# Patient Record
Sex: FEMALE | URBAN - METROPOLITAN AREA
[De-identification: names, ages, dates, MRNs, and addresses within clinical notes are randomized per-mention and may not be internally consistent; named-entity substitution may affect disease eponyms.]

---

## 2017-11-08 ENCOUNTER — THERAPY VISIT (OUTPATIENT)
Dept: PHYSICAL THERAPY | Facility: CLINIC | Age: 67
End: 2017-11-08
Payer: COMMERCIAL

## 2017-11-08 DIAGNOSIS — G89.29 CHRONIC BILATERAL LOW BACK PAIN WITHOUT SCIATICA: Primary | ICD-10-CM

## 2017-11-08 DIAGNOSIS — M54.50 CHRONIC BILATERAL LOW BACK PAIN WITHOUT SCIATICA: Primary | ICD-10-CM

## 2017-11-08 PROCEDURE — 97112 NEUROMUSCULAR REEDUCATION: CPT | Mod: GP | Performed by: PHYSICAL THERAPIST

## 2017-11-08 PROCEDURE — 97161 PT EVAL LOW COMPLEX 20 MIN: CPT | Mod: GP | Performed by: PHYSICAL THERAPIST

## 2017-11-08 PROCEDURE — 97110 THERAPEUTIC EXERCISES: CPT | Mod: GP | Performed by: PHYSICAL THERAPIST

## 2017-11-08 NOTE — MR AVS SNAPSHOT
After Visit Summary   11/8/2017    Bharath Winchester    MRN: [id number]           Patient Information     Date Of Birth          1950        Visit Information        Provider Department      11/8/2017 3:30 PM Keira Jett, PT [facility] Athletic [facility] Physical [facility]        Today's Diagnoses     Chronic bilateral low back pain without sciatica    -  1       Follow-ups after your visit        Your next 10 appointments already scheduled     Nov 16, 2017  4:10 PM CST   FLORESITA Spine with Keira Jett PT   [facility] Athletic [facility] Physical Therapy ([facility])    [address] [place] [address] [address]  [facility] [address]   [phone]            Nov 24, 2017  7:00 AM CST   FLORESITA Spine with Keira Jett PT   [facility] Physical Therapy ([facility])    [address] [place] [address] [address]  [facility] [address]   [phone]            Dec 01, 2017  7:00 AM CST   FLORESITA Spine with Keira Jett PT   [facility] Physical Therapy ([facility])    [address] [place] [address] [address]  [facility] [address]   [phone]              Who to contact     If you have questions or need follow up information about today's clinic visit or your schedule please contact [facility] ATHLETIC [facility] PHYSICAL [facility] directly at [phone].  Normal or non-critical lab and imaging results will be communicated to you by MyChart, letter or phone within 4 business days after the clinic has received the results. If you do not hear from us within 7 days, please contact the clinic through MyChart or phone. If you have a critical or abnormal lab result, we will notify you by phone as soon as possible.  Submit refill requests through [employer] or call your pharmacy and they will forward the refill request to us. Please allow 3 business days for  "your refill to be completed.          Additional Information About Your Visit        CompareAwayhart Information     [employer] lets you send messages to your doctor, view your test results, renew your prescriptions, schedule appointments and more. To sign up, go to [email or web address] at the [email or web address] . Click on \"Log in\" on the left side of the screen, which will take you to the Welcome page. Then click on \"Sign up Now\" on the right side of the page.     You will be asked to enter the access code listed below, as well as some personal information. Please follow the directions to create your username and password.     Your access code is: [email or web address]  Expires: 2018  5:23 PM     Your access code will  in 90 days. If you need help or a new code, please call your [place] clinic or [phone].        Care EveryWhere ID     This is your Care EveryWhere ID. This could be used by other organizations to access your [place] medical records  [id number]         Blood Pressure from Last 3 Encounters:   No data found for BP    Weight from Last 3 Encounters:   No data found for Wt              We Performed the Following     FLORESITA Inital Eval Report     Neuromuscular Re-Education     PT Eval, Low Complexity (68415)     Therapeutic Exercises        Primary Care Provider Office Phone # Fax #    Edna Bautista [phone]       [place] [facility] [address]  [facility] [address]        Equal Access to Services     MAMTA SIN : Hadii aad ku hadasho Soomaali, waaxda luqadaha, qaybta kaalmada adeegyada, cj oliver hayfadi potter . So [facility] [phone].    ATENCIÓN: Si habla español, tiene a bone disposición servicios gratuitos de asistencia lingüística. Llame al [phone].    We comply with applicable federal civil rights laws and [place] laws. We do not discriminate on the basis of race, color, national origin, age, disability, sex, sexual orientation, or gender identity.            Thank you!     Thank " you for choosing [facility] PHYSICAL THERAPY  for your care. Our goal is always to provide you with excellent care. Hearing back from our patients is one way we can continue to improve our services. Please take a few minutes to complete the written survey that you may receive in the mail after your visit with us. Thank you!             Your Updated Medication List - Protect others around you: Learn how to safely use, store and throw away your medicines at www.disposemymeds.org.      Notice  As of 11/8/2017  5:23 PM    You have not been prescribed any medications.

## 2017-11-08 NOTE — PROGRESS NOTES
[facility] Initial Evaluation    Subjective:    Patient is a 67 year old female presenting with rehab back hpi.   Bharath Winchester is a 67 year old female with a lumbar condition.  Condition occurred with:  Degenerative joint disease.  Condition occurred: during recreation/sport.  This is a chronic condition  I had low back pain and had fusion 7/2015 on L5-S1.  I had less pain after surgery, but still have pain.  This year over the summer, I was unable to keep the pain uncontrol.  I had increase in pain over the last few years.  If I over do I have increase in back pain.  I also have some stomach and some right shoulder pain.  I am right handed.    Patient reports pain:  Lumbar spine right and lumbar spine left (right side greater than left.).  Radiates to:  No radiation.  Pain is described as shooting and stabbing and is constant and reported as 2/10 and 7/10 (range).  Associated with: left sided weakness. Pain is worse in the P.M..  Symptoms are exacerbated by sitting, bending and twisting Relieved by: TENS unit, stretching.  Since onset symptoms are gradually worsening (more pain).  Special testing: blood work for insistene.  Previous treatment includes physical therapy (post surgery).    General health as reported by patient is good.  Pertinent medical history includes:  High blood pressure and migraines (changes in bowel and bladder).  Medical allergies: yes (codene).  Other surgeries include:  Orthopedic surgery (low back L5-S1 fusion).    Current occupation is psychologist.  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  Primary job tasks include:  Prolonged sitting.    Barriers: multilevel, gardening, [place]    Red flags:  None as reported by the patient.                        Objective:    Standing Alignment:    Cervical/Thoracic:  Convex thoracic scoliosis R and forward head  Shoulder/UE:  Rounded shoulders, protracted scapula L, protracted scapula  R, scapular winging L, scapular winging R, scapular abduction L and scapular abduction R  Lumbar:  Lordosis decr and sway back  Pelvic:  Iliac crest high R  Hip:  Greater trochanter high R        Gait:      Deviations:  Hip:  Decr dynamic control L and Trendelenberg LGeneral Deviations:  Stance time decr    Flexibility/Screens:   Positive screens:  LumbarNegative screens: Hip (weakness)     Lower Extremity:  Decreased left lower extremity flexibility:Hamstrings and Gastroc    Decreased right lower extremity flexibility:  Hamstrings and Gastroc               Lumbar/SI Evaluation  ROM:    AROM Lumbar:   Flexion:        Moderate movement  Ext:                    WFL   Side Bend:        Left:     Right:   Rotation:           Left:     Right:   Side Glide:        Left:     Right:         Strength: left hip abduction 3+/5, right 4+/5, TA 1+/5  Lumbar Myotomes:    T12-L3 (Hip Flex):  Left: 5    Right: 5  L2-4 (Quads):  Left:  5    Right:  5  L4 (Ankle DF):  Left:  5      L5 (Great Toe Ext): Left: 4    Right: 4           Neural Tension/Mobility:      Left side:SLR or Slump (tightness)  negative.     Right side:   Slump (tightness) or SLR  negative.   Lumbar Palpation:    Tenderness present at Left:    Gluteus Medius    Functional Tests:  Core strength and proprioception lumbar: balance left leg 0, right 20 sec         Lumbar Provocation:    Left positive with:  Mobility  Right positive with: Mobility  Spinal Segmental Conclusions: NA                                                       General     ROS    Assessment/Plan:      Patient is a 67 year old female with lumbar complaints.    Patient has the following significant findings with corresponding treatment plan.                Diagnosis 1:  Low back pain, s/p back fusion L5-S1 and scoliosis  Pain -  manual therapy, self management, education and home program  Decreased ROM/flexibility - manual therapy, therapeutic exercise, therapeutic activity and home  program  Decreased strength - therapeutic exercise, therapeutic activities and home program  Impaired balance - neuro re-education, gait training, therapeutic activities and home program  Impaired gait - gait training and home program  Impaired muscle performance - neuro re-education and home program  Decreased function - therapeutic activities and home program  Impaired posture - neuro re-education, therapeutic activities and home program    Therapy Evaluation Codes:   1) History comprised of:   Personal factors that impact the plan of care:      Past/current experiences and Time since onset of symptoms.    Comorbidity factors that impact the plan of care are:      High blood pressure, Migraines/headaches and back surgery and changes in bowel and bladder.     Medications impacting care: High blood pressure and Sleep.  2) Examination of Body Systems comprised of:   Body structures and functions that impact the plan of care:      Hip, Lumbar spine, Pelvis and Thoracic Spine.   Activity limitations that impact the plan of care are:      Bathing, Bending, Driving, Dressing, Lifting, Sitting, Stairs, Walking, Working and Laying down.  3) Clinical presentation characteristics are:   Stable/Uncomplicated.  4) Decision-Making    Moderate complexity using standardized patient assessment instrument and/or measureable assessment of functional outcome.  Cumulative Therapy Evaluation is: Low complexity.    Previous and current functional limitations:  (See Goal Flow Sheet for this information)    Short term and Long term goals: (See Goal Flow Sheet for this information)     Communication ability:  Patient appears to be able to clearly communicate and understand verbal and written communication and follow directions correctly.  Treatment Explanation - The following has been discussed with the patient:   RX ordered/plan of care  Possible risks and side effects  This patient would benefit from PT intervention to resume normal  activities.   Rehab potential is good.    Frequency:  1 X week, once daily  Duration:  for 8 weeks  Discharge Plan:  Achieve all LTG.  Independent in home treatment program.    Please refer to the daily flowsheet for treatment today, total treatment time and time spent performing 1:1 timed codes.

## 2017-11-08 NOTE — LETTER
[facility] ATHLETIC [facility] PHYSICAL THERAPY  [address]  [facility] [address]  [phone]    2017    Re: Bharath Winchester   :   1950  MRN:  [id number]   REFERRING PHYSICIAN:   Edna Bautista    [facility]    Date of Initial Evaluation: 2017  Visits:  Rxs Used: 1  Reason for Referral:  Chronic bilateral low back pain without sciatica    EVALUATION SUMMARY    Subjective:  Patient is a 67 year old female presenting with rehab back hpi.   Bharath Winchester is a 67 year old female with a lumbar condition.  Condition occurred with: Degenerative joint disease.  Condition occurred: during recreation/sport.  This is a chronic condition  I had low back pain and had fusion 2015 on L5-S1.  I had less pain after surgery, but still have pain.  This year over the summer, I was unable to keep the pain uncontrol.  I had increase in pain over the last few years.  If I over do I have increase in back pain.  I also have some stomach and some right shoulder pain.  I am right handed.    Patient reports pain:  Lumbar spine right and lumbar spine left (right side greater than left.).  Radiates to:  No radiation.  Pain is described as shooting and stabbing and is constant and reported as 2/10 and 7/10 (range).  Associated with: left sided weakness. Pain is worse in the P.M..  Symptoms are exacerbated by sitting, bending and twisting Relieved by: TENS unit, stretching.  Since onset symptoms are gradually worsening (more pain).  Special testing: blood work for insistene.  Previous treatment includes physical therapy (post surgery).    General health as reported by patient is good.  Pertinent medical history includes:  High blood pressure and migraines (changes in bowel and bladder).  Medical allergies: yes (codene).  Other surgeries include:  Orthopedic surgery (low back L5-S1 fusion).    Current occupation is  psychologist.  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  Primary job tasks include:  Prolonged sitting.  Barriers: multilevel, gardening, [place]  Red flags:  None as reported by the patient.                  Objective:  Standing Alignment:    Cervical/Thoracic:  Convex thoracic scoliosis R and forward head  Shoulder/UE:  Rounded shoulders, protracted scapula L, protracted scapula R, scapular winging L, scapular winging R, scapular abduction L and scapular abduction R  Lumbar:  Lordosis decr and sway back  Pelvic:  Iliac crest high R  Hip:  Greater trochanter high R    Re: Bharath Winchester   :   1950    Gait:   Deviations:  Hip:  Decr dynamic control L and Trendelenberg LGeneral Deviations:  Stance time decr  Flexibility/Screens:   Positive screens:  LumbarNegative screens: Hip (weakness)   Lower Extremity:  Decreased left lower extremity flexibility:Hamstrings and Gastroc  Decreased right lower extremity flexibility:  Hamstrings and Gastroc       Lumbar/SI Evaluation  ROM:    AROM Lumbar:   Flexion:        Moderate movement  Ext:                    WFL   Side Bend:        Left:     Right:   Rotation:           Left:     Right:   Side Glide:        Left:     Right:       Strength: left hip abduction 3+/5, right 4+/5, TA 1+/5  Lumbar Myotomes:    T12-L3 (Hip Flex):  Left: 5    Right: 5  L2-4 (Quads):  Left:  5    Right:  5  L4 (Ankle DF):  Left:  5      L5 (Great Toe Ext): Left: 4    Right: 4   Neural Tension/Mobility:    Left side:SLR or Slump (tightness)  negative.   Right side:   Slump (tightness) or SLR  negative.   Lumbar Palpation:    Tenderness present at Left:    Gluteus Medius  Functional Tests:  Core strength and proprioception lumbar: balance left leg 0, right 20 sec   Lumbar Provocation:    Left positive with:  Mobility  Right positive with: Mobility  Spinal Segmental Conclusions: NA    Assessment/Plan:    Patient is a 67 year old female with lumbar  complaints.    Patient has the following significant findings with corresponding treatment plan.                Diagnosis 1:  Low back pain, s/p back fusion L5-S1 and scoliosis  Pain -  manual therapy, self management, education and home program  Decreased ROM/flexibility - manual therapy, therapeutic exercise, therapeutic activity and home program  Decreased strength - therapeutic exercise, therapeutic activities and home program  Impaired balance - neuro re-education, gait training, therapeutic activities and home program  Impaired gait - gait training and home program  Impaired muscle performance - neuro re-education and home program  Decreased function - therapeutic activities and home program  Impaired posture - neuro re-education, therapeutic activities and home program      Re: Bharath Winchester   :   1950    Therapy Evaluation Codes:   1) History comprised of:   Personal factors that impact the plan of care:      Past/current experiences and Time since onset of symptoms.    Comorbidity factors that impact the plan of care are:      High blood pressure, Migraines/headaches and back surgery and changes in bowel and bladder.     Medications impacting care: High blood pressure and Sleep.  2) Examination of Body Systems comprised of:   Body structures and functions that impact the plan of care:      Hip, Lumbar spine, Pelvis and Thoracic Spine.   Activity limitations that impact the plan of care are:      Bathing, Bending, Driving, Dressing, Lifting, Sitting, Stairs, Walking, Working and Laying down.  3) Clinical presentation characteristics are:   Stable/Uncomplicated.  4) Decision-Making    Moderate complexity using standardized patient assessment instrument and/or measureable assessment of functional outcome.  Cumulative Therapy Evaluation is: Low complexity.    Previous and current functional limitations:  (See Goal Flow Sheet for this information)    Short term and Long term goals: (See Goal Flow Sheet  for this information)     Communication ability:  Patient appears to be able to clearly communicate and understand verbal and written communication and follow directions correctly.  Treatment Explanation - The following has been discussed with the patient:   RX ordered/plan of care  Possible risks and side effects  This patient would benefit from PT intervention to resume normal activities.   Rehab potential is good.    Frequency:  1 X week, once daily  Duration:  for 8 weeks  Discharge Plan:  Achieve all LTG.  Independent in home treatment program.    Thank you for your referral.    INQUIRIES  Therapist: Keira Jett, PT   [facility]ICAL [facility]  [address] [address]  Phone: [phone]  Fax: [phone]

## 2017-11-16 ENCOUNTER — THERAPY VISIT (OUTPATIENT)
Dept: PHYSICAL THERAPY | Facility: CLINIC | Age: 67
End: 2017-11-16
Payer: COMMERCIAL

## 2017-11-16 DIAGNOSIS — M54.50 CHRONIC BILATERAL LOW BACK PAIN WITHOUT SCIATICA: ICD-10-CM

## 2017-11-16 DIAGNOSIS — G89.29 CHRONIC BILATERAL LOW BACK PAIN WITHOUT SCIATICA: ICD-10-CM

## 2017-11-16 PROCEDURE — 97112 NEUROMUSCULAR REEDUCATION: CPT | Mod: GP | Performed by: PHYSICAL THERAPIST

## 2017-11-16 PROCEDURE — 97110 THERAPEUTIC EXERCISES: CPT | Mod: GP | Performed by: PHYSICAL THERAPIST

## 2017-11-16 PROCEDURE — 97530 THERAPEUTIC ACTIVITIES: CPT | Mod: GP | Performed by: PHYSICAL THERAPIST

## 2017-11-24 ENCOUNTER — THERAPY VISIT (OUTPATIENT)
Dept: PHYSICAL THERAPY | Facility: CLINIC | Age: 67
End: 2017-11-24
Payer: COMMERCIAL

## 2017-11-24 DIAGNOSIS — M54.50 CHRONIC BILATERAL LOW BACK PAIN WITHOUT SCIATICA: ICD-10-CM

## 2017-11-24 DIAGNOSIS — G89.29 CHRONIC BILATERAL LOW BACK PAIN WITHOUT SCIATICA: ICD-10-CM

## 2017-11-24 PROCEDURE — 97530 THERAPEUTIC ACTIVITIES: CPT | Mod: GP | Performed by: PHYSICAL THERAPIST

## 2017-11-24 PROCEDURE — 97112 NEUROMUSCULAR REEDUCATION: CPT | Mod: GP | Performed by: PHYSICAL THERAPIST

## 2017-11-24 PROCEDURE — 97110 THERAPEUTIC EXERCISES: CPT | Mod: GP | Performed by: PHYSICAL THERAPIST

## 2017-12-01 ENCOUNTER — THERAPY VISIT (OUTPATIENT)
Dept: PHYSICAL THERAPY | Facility: CLINIC | Age: 67
End: 2017-12-01
Payer: COMMERCIAL

## 2017-12-01 DIAGNOSIS — G89.29 CHRONIC BILATERAL LOW BACK PAIN WITHOUT SCIATICA: ICD-10-CM

## 2017-12-01 DIAGNOSIS — M54.50 CHRONIC BILATERAL LOW BACK PAIN WITHOUT SCIATICA: ICD-10-CM

## 2017-12-01 PROCEDURE — 97110 THERAPEUTIC EXERCISES: CPT | Mod: GP | Performed by: PHYSICAL THERAPIST

## 2017-12-01 PROCEDURE — 97112 NEUROMUSCULAR REEDUCATION: CPT | Mod: GP | Performed by: PHYSICAL THERAPIST

## 2018-01-08 ENCOUNTER — THERAPY VISIT (OUTPATIENT)
Dept: PHYSICAL THERAPY | Facility: CLINIC | Age: 68
End: 2018-01-08
Payer: COMMERCIAL

## 2018-01-08 DIAGNOSIS — M54.50 CHRONIC BILATERAL LOW BACK PAIN WITHOUT SCIATICA: ICD-10-CM

## 2018-01-08 DIAGNOSIS — G89.29 CHRONIC BILATERAL LOW BACK PAIN WITHOUT SCIATICA: ICD-10-CM

## 2018-01-08 PROCEDURE — 97110 THERAPEUTIC EXERCISES: CPT | Mod: GP | Performed by: PHYSICAL THERAPIST

## 2018-01-25 ENCOUNTER — THERAPY VISIT (OUTPATIENT)
Dept: PHYSICAL THERAPY | Facility: CLINIC | Age: 68
End: 2018-01-25
Payer: COMMERCIAL

## 2018-01-25 DIAGNOSIS — G89.29 CHRONIC BILATERAL LOW BACK PAIN WITHOUT SCIATICA: ICD-10-CM

## 2018-01-25 DIAGNOSIS — M54.50 CHRONIC BILATERAL LOW BACK PAIN WITHOUT SCIATICA: ICD-10-CM

## 2018-01-25 PROCEDURE — 97110 THERAPEUTIC EXERCISES: CPT | Mod: GP | Performed by: PHYSICAL THERAPIST

## 2018-01-25 PROCEDURE — 97112 NEUROMUSCULAR REEDUCATION: CPT | Mod: GP | Performed by: PHYSICAL THERAPIST

## 2018-01-25 NOTE — MR AVS SNAPSHOT
"              After Visit Summary   2018    Bharath Winchester    MRN: [id number]           Patient Information     Date Of Birth          1950        Visit Information        Provider Department      2018 11:40 AM Keira Jett PT [facility] Athletic [facility] Physical [facility]        Today's Diagnoses     Chronic bilateral low back pain without sciatica           Follow-ups after your visit        Who to contact     If you have questions or need follow up information about today's clinic visit or your schedule please contact [facility] ATHLETIC [facility] PHYSICAL [facility] directly at [phone].  Normal or non-critical lab and imaging results will be communicated to you by [employer], letter or phone within 4 business days after the clinic has received the results. If you do not hear from us within 7 days, please contact the clinic through [employer] or phone. If you have a critical or abnormal lab result, we will notify you by phone as soon as possible.  Submit refill requests through [employer] or call your pharmacy and they will forward the refill request to us. Please allow 3 business days for your refill to be completed.          Additional Information About Your Visit        MyChart Information     [employer] lets you send messages to your doctor, view your test results, renew your prescriptions, schedule appointments and more. To sign up, go to [email or web address] . Click on \"Log in\" on the left side of the screen, which will take you to the Welcome page. Then click on \"Sign up Now\" on the right side of the page.     You will be asked to enter the access code listed below, as well as some personal information. Please follow the directions to create your username and password.     Your access code is: [email or web address]  Expires: 2018  5:23 PM     Your access code will  in 90 days. If you need help or a new code, please call your [place] clinic or " [phone].        Care EveryWhere ID     This is your Care EveryWhere ID. This could be used by other organizations to access your [place] medical records  [id number]         Blood Pressure from Last 3 Encounters:   No data found for BP    Weight from Last 3 Encounters:   No data found for Wt              We Performed the Following     FLORESITA Progress Notes Report     Neuromuscular Re-Education     Therapeutic Exercises        Primary Care Provider Office Phone # Fax #    Edna Bautista [phone]       [place] [facility] [address] [facility] [address] [address]        Equal Access to Services     [facility]: Hadii aad ku hadasho Soomaali, waaxda luqadaha, qaybta kaalmada adeegyada, waxay idiin hayaan adeeg martin potter . So [facility] [phone].    ATENCIÓN: Si habla español, tiene a bone disposición servicios gratuitos de asistencia lingüística. [facility] [phone].    We comply with applicable federal civil rights laws and [place] laws. We do not discriminate on the basis of race, color, national origin, age, disability, sex, sexual orientation, or gender identity.            Thank you!     Thank you for choosing [facility] PHYSICAL THERAPY  for your care. Our goal is always to provide you with excellent care. Hearing back from our patients is one way we can continue to improve our services. Please take a few minutes to complete the written survey that you may receive in the mail after your visit with us. Thank you!             Your Updated Medication List - Protect others around you: Learn how to safely use, store and throw away your medicines at www.disposemymeds.org.      Notice  As of 1/25/2018  8:06 PM    You have not been prescribed any medications.

## 2018-01-25 NOTE — LETTER
[facility] ATHLETIC [facility] PHYSICAL THERAPY  [address]  [place] [address]  [phone]    2018    Re: Bharath Winchester   :   1950  MRN:  [id number]   REFERRING PHYSICIAN:   Edna Bautista    [facility] ATHLETIC [facility] PHYSICAL [facility]    Date of Initial Evaluation: 2017  Visits:  Rxs Used: 6  Reason for Referral:  Chronic bilateral low back pain without sciatica    PROGRESS  REPORT  Progress reporting period is from 2017 to 2018.       SUBJECTIVE  HPI  Oswestry Score: 30 %  Subjective changes noted by patient:  I have gotten some medication which has helped me sleep.  I have been trying to volunteer.  I had to shovel and watch how I do things.  I have integrated good habit.    Current Pain level: 3/10 (in the morning, the afternoon 6-7/10).     Initial Pain level: 7/10.   Changes in function:  Yes (See Goal flowsheet attached for changes in current functional level)  Adverse reaction to treatment or activity: activity - lifting, medical testing all increase in back soreness.    OBJECTIVE  Changes noted in objective findings:  Yes,  TROM flexion mid shin SB left 75% with pulling, right WFL, extension moderate movement.  Strength: hip abduction left 4+/5, right 4/5  TA 2-/5.  Increase awareness of posture and back position.  Instruction in basic body mechanics.  Discussed with patient progression of exercises and home exercise program.     ASSESSMENT/PLAN  Updated problem list and treatment plan: Diagnosis 1:  Low back pain (s/p lumbar fusion)  Pain -  self management, education, directional preference exercise and home program  Decreased ROM/flexibility - manual therapy, therapeutic exercise, therapeutic activity and home program  Decreased strength - therapeutic exercise, therapeutic activities and home program  Impaired muscle performance - neuro re-education and home program  STG/LTGs have been met or  progress has been made towards goals:  Yes (See Goal flow sheet completed today.)  Assessment of Progress: The patient's condition is improving.  The patient's condition has potential to improve.  Re: Bharath Winchester   :   1950    Self Management Plans:  Patient has been instructed in a home treatment program.  Patient  has been instructed in self management of symptoms.    Recommendations:  Patient would like to try exercises on her own.  Patient to call if any questions.  Patient does have a few appointments left on original order and may return if unable to progress with exercises or function.  Patient also having other medical testing.  Thank you for the opportunity of living with this motivated individual.    Thank you for your referral.    INQUIRIES  Therapist: Keira Jett PT  [facility]  [address] [address]  Phone: [phone]  Fax: [phone]

## 2018-01-26 NOTE — PROGRESS NOTES
Subjective:  HPI  Oswestry Score: 30 %                 Objective:  System    Physical Exam    General     ROS    Assessment/Plan:    PROGRESS  REPORT    Progress reporting period is from 11/8/2017 to 1/25/2018.       SUBJECTIVE  Subjective changes noted by patient:  I have gotten some medication which has helped me sleep.  I have been trying to volunteer.  I had to shovel and watch how I do things.  I have integrated good habit.     Current Pain level: 3/10 (in the morning, the afternoon 6-7/10).      Initial Pain level: 7/10.   Changes in function:  Yes (See Goal flowsheet attached for changes in current functional level)  Adverse reaction to treatment or activity: activity - lifting, medical testing all increase in back soreness.    OBJECTIVE  Changes noted in objective findings:  Yes,  TROM flexion mid shin SB left 75% with pulling, right WFL, extension moderate movement.  Strength: hip abduction left 4+/5, right 4/5  TA 2-/5.  Increase awareness of posture and back position.  Instruction in basic body mechanics.  Discussed with patient progression of exercises and home exercise program.       ASSESSMENT/PLAN  Updated problem list and treatment plan: Diagnosis 1:  Low back pain (s/p lumbar fusion)  Pain -  self management, education, directional preference exercise and home program  Decreased ROM/flexibility - manual therapy, therapeutic exercise, therapeutic activity and home program  Decreased strength - therapeutic exercise, therapeutic activities and home program  Impaired muscle performance - neuro re-education and home program  STG/LTGs have been met or progress has been made towards goals:  Yes (See Goal flow sheet completed today.)  Assessment of Progress: The patient's condition is improving.  The patient's condition has potential to improve.  Self Management Plans:  Patient has been instructed in a home treatment program.  Patient  has been instructed in self management of  symptoms.      Recommendations:  Patient would like to try exercises on her own.  Patient to call if any questions.  Patient does have a few appointments left on original order and may return if unable to progress with exercises or function.  Patient also having other medical testing.  Thank you for the opportunity of living with this motivated individual.    Please refer to the daily flowsheet for treatment today, total treatment time and time spent performing 1:1 timed codes.

## 2018-02-19 PROBLEM — M54.50 CHRONIC BILATERAL LOW BACK PAIN WITHOUT SCIATICA: Status: RESOLVED | Noted: 2017-11-08 | Resolved: 2018-02-19

## 2018-02-19 PROBLEM — G89.29 CHRONIC BILATERAL LOW BACK PAIN WITHOUT SCIATICA: Status: RESOLVED | Noted: 2017-11-08 | Resolved: 2018-02-19

## 2018-08-30 ENCOUNTER — THERAPY VISIT (OUTPATIENT)
Dept: PHYSICAL THERAPY | Facility: CLINIC | Age: 68
End: 2018-08-30
Payer: COMMERCIAL

## 2018-08-30 DIAGNOSIS — G89.29 CHRONIC BILATERAL LOW BACK PAIN WITHOUT SCIATICA: Primary | ICD-10-CM

## 2018-08-30 DIAGNOSIS — M54.50 CHRONIC BILATERAL LOW BACK PAIN WITHOUT SCIATICA: Primary | ICD-10-CM

## 2018-08-30 PROCEDURE — 97112 NEUROMUSCULAR REEDUCATION: CPT | Mod: GP | Performed by: PHYSICAL THERAPIST

## 2018-08-30 PROCEDURE — 97110 THERAPEUTIC EXERCISES: CPT | Mod: GP | Performed by: PHYSICAL THERAPIST

## 2018-08-30 PROCEDURE — 97161 PT EVAL LOW COMPLEX 20 MIN: CPT | Mod: GP | Performed by: PHYSICAL THERAPIST

## 2018-08-30 NOTE — PROGRESS NOTES
[facility] Initial Evaluation  Subjective:  Patient is a 68 year old female presenting with rehab back hpi. The history is provided by the patient. No  was used.   Bharath Winchester is a 68 year old female with a lumbar condition.  Condition occurred with:  Degenerative joint disease.  Condition occurred: for unknown reasons.  This is a chronic condition  I have had back pain for a long time.  I have had a L5-S1 lumbar fusion.  My  was sick earlier this year and had difficult with his back.  He ended up with back surgery and now he can't lift anything.  So I have been doing a lot more activity, lifting, gardening.  I am still walking and watching what I am doing but feel like I need some help.  I saw the MD on 8/21/2018.    I am watching how I lift..    Patient reports pain:  Lumbar spine left and lumbar spine right.  Radiates to:  No radiation.  Pain is described as aching (dull) and is constant and reported as 2/10 and 5/10 (ranges).  Associated with: right leg has done some buckling. Pain is worse in the P.M..  Symptoms are exacerbated by sitting, bending, carrying and lifting and relieved by heat and activity/movement (TENS pads).  Since onset symptoms are gradually worsening (march).  Special testing: none.  Previous treatment includes physical therapy.    General health as reported by patient is good.  Pertinent medical history includes:  Migraines/headaches (digestive issues).  Medical allergies: no.  Other surgeries include:  Orthopedic surgery (back fusion).  Current medications:  High blood pressure medication and sleep medication.  Current occupation is Psychologist, part time  .  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  Primary job tasks include:  Lifting, driving and prolonged sitting.    Barriers: house in cities/[facility].    Red flags:  None as reported by the patient.                        Objective:  Standing Alignment:   "  Cervical/Thoracic:  Convex thoracic scoliosis R (fair plus sitting posture)  Shoulder/UE:  Scapular upward rotation R  Lumbar:  Lordosis decr  Pelvic:  Iliac crest high R  Hip:  Greater trochanter high R        Gait:      Deviations:  Hip:  Decr dynamic control LAnkle:  Heel strike decr L, heel strike decr R, push off decr L and push off decr R  Non-Weight Bearing:    Pelvis:  ASIS high L        Flexibility/Screens:   Positive screens:  Lumbar    Lower Extremity:  Decreased left lower extremity flexibility:Hamstrings and Gastroc    Decreased right lower extremity flexibility:  Hamstrings and Gastroc               Lumbar/SI Evaluation  ROM:    AROM Lumbar:   Flexion:        Lower calf  Ext:                    WFL   Side Bend:        Left:     Right:   Rotation:           Left:     Right:   Side Glide:        Left:     Right:         Strength: TA 1/5, hip abduction right 4/5, left 4/5, able to single leg partial squat x 5 each leg with fair control.  Lumbar Myotomes:    T12-L3 (Hip Flex):  Left: 5    Right: 5  L2-4 (Quads):  Left:  5    Right:  5  L4 (Ankle DF):  Left:  5    Right:  5  L5 (Great Toe Ext): Left: 4+              Neural Tension/Mobility:      Left side:Slump  negative.     Right side:   Slump (tightness)  negative.   Lumbar Palpation:    Tenderness present at Left:    Quadratus Lumborum and Erector Spinae  Tenderness present at Right: Quadratus Lumborum and Erector Spinae  Functional Tests:  Core strength and proprioception lumbar: balance poor bilateral decrease in hip control , bilateral squat moderate movement,             SI joint/Sacrum:        Forward bend seated left: SI compression/distraction \"feels good no pain\"                                                   General     ROS    Assessment/Plan:    Patient is a 68 year old female with lumbar complaints.    Patient has the following significant findings with corresponding treatment plan.                Diagnosis 1:  Low back pain   Pain -  " self management, education, directional preference exercise and home program  Decreased ROM/flexibility - manual therapy, therapeutic exercise, therapeutic activity and home program  Decreased strength - therapeutic exercise, therapeutic activities and home program  Impaired balance - neuro re-education, therapeutic activities and home program  Impaired gait - gait training and home program  Impaired muscle performance - neuro re-education and home program  Decreased function - therapeutic activities and home program  Evaluation ongoing.    Therapy Evaluation Codes:   1) History comprised of:   Personal factors that impact the plan of care:      Past/current experiences, Time since onset of symptoms and amount of driving to and from houses.    Comorbidity factors that impact the plan of care are:      past back fusion.     Medications impacting care: High blood pressure and Sleep.  2) Examination of Body Systems comprised of:   Body structures and functions that impact the plan of care:      Lumbar spine and Pelvis.   Activity limitations that impact the plan of care are:      Bathing, Bending, Cooking, Driving, Lifting, Sitting and Sleeping.  3) Clinical presentation characteristics are:   Stable/Uncomplicated.  4) Decision-Making    Low complexity using standardized patient assessment instrument and/or measureable assessment of functional outcome.  Cumulative Therapy Evaluation is: Low complexity.    Previous and current functional limitations:  (See Goal Flow Sheet for this information)    Short term and Long term goals: (See Goal Flow Sheet for this information)     Communication ability:  Patient appears to be able to clearly communicate and understand verbal and written communication and follow directions correctly.  Treatment Explanation - The following has been discussed with the patient:   RX ordered/plan of care  Possible risks and side effects  This patient would benefit from PT intervention to resume  normal activities.   Rehab potential is good.    Frequency:  1 X week, once daily  Duration:  for 6 weeks  Discharge Plan:  Achieve all LTG.  Independent in home treatment program.    Please refer to the daily flowsheet for treatment today, total treatment time and time spent performing 1:1 timed codes.

## 2018-08-30 NOTE — MR AVS SNAPSHOT
After Visit Summary   8/30/2018    Bharath Winchester    MRN: [id number]           Patient Information     Date Of Birth          1950        Visit Information        Provider Department      8/30/2018 2:50 PM Kiera Jett, PT [facility] Athletic [facility] Physical [facility]        Today's Diagnoses     Chronic bilateral low back pain without sciatica    -  1       Follow-ups after your visit        Who to contact     If you have questions or need follow up information about today's clinic visit or your schedule please contact [facility], located within [facility] PHYSICAL [facility] directly at [phone].  Normal or non-critical lab and imaging results will be communicated to you by MyChart, letter or phone within 4 business days after the clinic has received the results. If you do not hear from us within 7 days, please contact the clinic through MyChart or phone. If you have a critical or abnormal lab result, we will notify you by phone as soon as possible.  Submit refill requests through [employer] or call your pharmacy and they will forward the refill request to us. Please allow 3 business days for your refill to be completed.          Additional Information About Your Visit        Care EveryWhere ID     This is your Care EveryWhere ID. This could be used by other organizations to access your [facility] medical records  [id number]         Blood Pressure from Last 3 Encounters:   No data found for BP    Weight from Last 3 Encounters:   No data found for Wt              We Performed the Following     HC PT EVAL, LOW COMPLEXITY     FLORESITA INITIAL EVAL REPORT     NEUROMUSCULAR RE-EDUCATION     THERAPEUTIC EXERCISES        Primary Care Provider Office Phone # Fax #    Edna Sagearian [phone]       [facility] [address] [place] [address]        Equal Access to Services     MAMTA SIN : Bebo Thomason, bereket prieto, paulie  cj saavedradarren potter ah. Pamela [facility] [phone].    ATENCIÓN: Si habla chepe, tiene a bone disposición servicios gratuitos de asistencia lingüística. Llame al [phone].    We comply with applicable federal civil rights laws and [place] laws. We do not discriminate on the basis of race, color, national origin, age, disability, sex, sexual orientation, or gender identity.            Thank you!     Thank you for choosing [facility] PHYSICAL THERAPY  for your care. Our goal is always to provide you with excellent care. Hearing back from our patients is one way we can continue to improve our services. Please take a few minutes to complete the written survey that you may receive in the mail after your visit with us. Thank you!             Your Updated Medication List - Protect others around you: Learn how to safely use, store and throw away your medicines at www.disposemymeds.org.      Notice  As of 8/30/2018  8:14 PM    You have not been prescribed any medications.

## 2018-08-30 NOTE — LETTER
[facility] ATHLETIC [facility] PHYSICAL THERAPY  [address]  [place] [address]  [phone]    2018    Re: Bharath Winchester   :   1950  MRN:  [id number]   REFERRING PHYSICIAN:   Edna Bautista    [facility] ATHLET[facility] PHYSICAL [facility]    Date of Initial Evaluation:  2018  Visits:  Rxs Used: 1  Reason for Referral:  Chronic bilateral low back pain without sciatica    EVALUATION SUMMARY    Subjective:  Patient is a 68 year old female presenting with rehab back hpi. The history is provided by the patient. No  was used.   Bharath Winchester is a 68 year old female with a lumbar condition.  Condition occurred with: Degenerative joint disease.  Condition occurred: for unknown reasons.  This is a chronic condition  I have had back pain for a long time.  I have had a L5-S1 lumbar fusion.  My  was sick earlier this year and had difficult with his back.  He ended up with back surgery and now he can't lift anything.  So I have been doing a lot more activity, lifting, gardening.  I am still walking and watching what I am doing but feel like I need some help.  I saw the MD on 2018.    I am watching how I lift..    Patient reports pain:  Lumbar spine left and lumbar spine right.  Radiates to:  No radiation.  Pain is described as aching (dull) and is constant and reported as 2/10 and 5/10 (ranges).  Associated with: right leg has done some buckling. Pain is worse in the P.M.  Symptoms are exacerbated by sitting, bending, carrying and lifting and relieved by heat and activity/movement (TENS pads).  Since onset symptoms are gradually worsening (march).  Special testing: none.  Previous treatment includes physical therapy.    General health as reported by patient is good.  Pertinent medical history includes:  Migraines/headaches (digestive issues).  Medical allergies: no.  Other surgeries include:  Orthopedic  surgery (back fusion).  Current medications:  High blood pressure medication and sleep medication.  Current occupation is Psychologist, part time.  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  Primary job tasks include:  Lifting, driving and prolonged sitting.  Barriers: house in cities/[place].  Red flags:  None as reported by the patient.                  Objective:  Standing Alignment:    Cervical/Thoracic:  Convex thoracic scoliosis R (fair plus sitting posture)  Shoulder/UE:  Scapular upward rotation R  Lumbar:  Lordosis decr  Pelvic:  Iliac crest high R  Hip:  Greater trochanter high R    Re: Bharath Winchester   :   1950    Gait:    Deviations:  Hip:  Decr dynamic control LAnkle:  Heel strike decr L, heel strike decr R, push off decr L and push off decr R  Non-Weight Bearing:    Pelvis:  ASIS high L  Flexibility/Screens:   Positive screens:  Lumbar  Lower Extremity:  Decreased left lower extremity flexibility:Hamstrings and Gastroc  Decreased right lower extremity flexibility:  Hamstrings and Gastroc       Lumbar/SI Evaluation  ROM:    AROM Lumbar:   Flexion:        Lower calf  Ext:                    WFL   Side Bend:        Left:     Right:   Rotation:           Left:     Right:   Side Glide:        Left:     Right:       Strength: TA 1/5, hip abduction right 4/5, left 4/5, able to single leg partial squat x 5 each leg with fair control.  Lumbar Myotomes:    T12-L3 (Hip Flex):  Left: 5    Right: 5  L2-4 (Quads):  Left:  5    Right:  5  L4 (Ankle DF):  Left:  5    Right:  5  L5 (Great Toe Ext): Left: 4+     Neural Tension/Mobility:   Left side:Slump  negative.   Right side:   Slump (tightness)  negative.   Lumbar Palpation:    Tenderness present at Left:    Quadratus Lumborum and Erector Spinae  Tenderness present at Right: Quadratus Lumborum and Erector Spinae  Functional Tests:  Core strength and proprioception lumbar: balance poor bilateral decrease in hip control , bilateral squat  "moderate movement,   SI joint/Sacrum:      Forward bend seated left: SI compression/distraction \"feels good no pain\"    Assessment/Plan:    Patient is a 68 year old female with lumbar complaints.    Patient has the following significant findings with corresponding treatment plan.                Diagnosis 1:  Low back pain   Pain -  self management, education, directional preference exercise and home program  Decreased ROM/flexibility - manual therapy, therapeutic exercise, therapeutic activity and home program  Decreased strength - therapeutic exercise, therapeutic activities and home program  Impaired balance - neuro re-education, therapeutic activities and home program  Impaired gait - gait training and home program  Impaired muscle performance - neuro re-education and home program  Decreased function - therapeutic activities and home program  Evaluation ongoing.    Re: Bharath Winchester   :   1950    Therapy Evaluation Codes:   1) History comprised of:   Personal factors that impact the plan of care:      Past/current experiences, Time since onset of symptoms and amount of driving to and from houses.    Comorbidity factors that impact the plan of care are:      past back fusion.     Medications impacting care: High blood pressure and Sleep.  2) Examination of Body Systems comprised of:   Body structures and functions that impact the plan of care:      Lumbar spine and Pelvis.   Activity limitations that impact the plan of care are:      Bathing, Bending, Cooking, Driving, Lifting, Sitting and Sleeping.  3) Clinical presentation characteristics are:   Stable/Uncomplicated.  4) Decision-Making    Low complexity using standardized patient assessment instrument and/or measureable assessment of functional outcome.  Cumulative Therapy Evaluation is: Low complexity.    Previous and current functional limitations:  (See Goal Flow Sheet for this information)    Short term and Long term goals: (See Goal Flow Sheet for " this information)     Communication ability:  Patient appears to be able to clearly communicate and understand verbal and written communication and follow directions correctly.  Treatment Explanation - The following has been discussed with the patient:   RX ordered/plan of care  Possible risks and side effects  This patient would benefit from PT intervention to resume normal activities.   Rehab potential is good.    Frequency:  1 X week, once daily  Duration:  for 6 weeks  Discharge Plan:  Achieve all LTG.  Independent in home treatment program.    Thank you for your referral.    INQUIRIES  Therapist: Keira Jett, PT  [facility] [place] [facility]  [address] [address]  Phone: [phone]  Fax: [phone]

## 2019-04-11 PROBLEM — M54.50 CHRONIC BILATERAL LOW BACK PAIN WITHOUT SCIATICA: Status: RESOLVED | Noted: 2018-08-30 | Resolved: 2019-04-11

## 2019-04-11 PROBLEM — G89.29 CHRONIC BILATERAL LOW BACK PAIN WITHOUT SCIATICA: Status: RESOLVED | Noted: 2018-08-30 | Resolved: 2019-04-11

## 2019-04-11 NOTE — PROGRESS NOTES
Patient seen for one time evaluation and treatment.  Patient did not return for further treatment and current status is unknown.  Please see initial evaluation for further information.

## 2019-05-06 ENCOUNTER — THERAPY VISIT (OUTPATIENT)
Dept: PHYSICAL THERAPY | Facility: CLINIC | Age: 69
End: 2019-05-06
Payer: COMMERCIAL

## 2019-05-06 DIAGNOSIS — M25.561 ACUTE PAIN OF RIGHT KNEE: ICD-10-CM

## 2019-05-06 PROCEDURE — 97110 THERAPEUTIC EXERCISES: CPT | Mod: GP | Performed by: PHYSICAL THERAPIST

## 2019-05-06 PROCEDURE — 97112 NEUROMUSCULAR REEDUCATION: CPT | Mod: GP | Performed by: PHYSICAL THERAPIST

## 2019-05-06 PROCEDURE — 97161 PT EVAL LOW COMPLEX 20 MIN: CPT | Mod: GP | Performed by: PHYSICAL THERAPIST

## 2019-05-06 ASSESSMENT — ACTIVITIES OF DAILY LIVING (ADL)
RAW_SCORE: 50
GO DOWN STAIRS: ACTIVITY IS MINIMALLY DIFFICULT
KNEEL ON THE FRONT OF YOUR KNEE: ACTIVITY IS MINIMALLY DIFFICULT
SIT WITH YOUR KNEE BENT: ACTIVITY IS NOT DIFFICULT
KNEE_ACTIVITY_OF_DAILY_LIVING_SCORE: 71.43
GO UP STAIRS: ACTIVITY IS FAIRLY DIFFICULT
HOW_WOULD_YOU_RATE_THE_CURRENT_FUNCTION_OF_YOUR_KNEE_DURING_YOUR_USUAL_DAILY_ACTIVITIES_ON_A_SCALE_FROM_0_TO_100_WITH_100_BEING_YOUR_LEVEL_OF_KNEE_FUNCTION_PRIOR_TO_YOUR_INJURY_AND_0_BEING_THE_INABILITY_TO_PERFORM_ANY_OF_YOUR_USUAL_DAILY_ACTIVITIES?: 50
LIMPING: I HAVE THE SYMPTOM BUT IT DOES NOT AFFECT MY ACTIVITY
PAIN: THE SYMPTOM AFFECTS MY ACTIVITY MODERATELY
WALK: ACTIVITY IS NOT DIFFICULT
GIVING WAY, BUCKLING OR SHIFTING OF KNEE: THE SYMPTOM AFFECTS MY ACTIVITY MODERATELY
SQUAT: ACTIVITY IS MINIMALLY DIFFICULT
HOW_WOULD_YOU_RATE_THE_OVERALL_FUNCTION_OF_YOUR_KNEE_DURING_YOUR_USUAL_DAILY_ACTIVITIES?: ABNORMAL
SWELLING: I DO NOT HAVE THE SYMPTOM
RISE FROM A CHAIR: ACTIVITY IS MINIMALLY DIFFICULT
STAND: ACTIVITY IS NOT DIFFICULT
AS_A_RESULT_OF_YOUR_KNEE_INJURY,_HOW_WOULD_YOU_RATE_YOUR_CURRENT_LEVEL_OF_DAILY_ACTIVITY?: ABNORMAL
WEAKNESS: THE SYMPTOM AFFECTS MY ACTIVITY MODERATELY
KNEE_ACTIVITY_OF_DAILY_LIVING_SUM: 50
STIFFNESS: THE SYMPTOM AFFECTS MY ACTIVITY MODERATELY

## 2019-05-06 NOTE — LETTER
[facility] ATHLETIC [facility] PHYSICAL THERAPY  [address]  [place] [address]  [phone]    May 7, 2019    Re: Bharath Winchester   :   1950  MRN:  [id number]   REFERRING PHYSICIAN:   Denise Lozano    [facility] ATHLETIC [facility] PHYSICAL [facility]    Date of Initial Evaluation:  2019  Visits:  Rxs Used: 1  Reason for Referral:  Acute pain of right knee    EVALUATION SUMMARY    Subjective:  The history is provided by the patient. No  was used.   Bharath Winchester is a 68 year old female with a right knee condition.  Condition occurred with:  Repetition/overuse (stairs).  Condition occurred: at home.  This is a new condition  On or about 2019, I was doing a lot of stairs to do some upstairs painting. My right knee became painful and stairs became difficult.  The knee pain is only going up the stairs..     Patient reports pain:  Anterior.  Radiates to:  Knee, lower leg and thigh (up the back ).  Pain is described as sharp (feeling of giving away) and is intermittent and reported as 4/10.  Associated symptoms:  Buckling/giving out and loss of strength. Pain is worse in the P.M..  Symptoms are exacerbated by ascending stairs and weight bearing and relieved by rest (stretching, decreasing activity).  Since onset symptoms are unchanged.  Special testing: none.  Previous treatment includes physical therapy (low back ).  There was moderate improvement following previous treatment.  General health as reported by patient is good.  Pertinent medical history includes:  Migraines/headaches and high blood pressure (back pain).  Medical allergies: no.  Other surgeries include:  Orthopedic surgery (low back).  Current medications:  High blood pressure medication.  Current occupation is psychologist.  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  Primary job tasks include:  Prolonged sitting (computer).  Barriers: house  and cabin.  with steps, gardening.  Red flags:  None as reported by the patient.                 Objective:  Standing Alignment:    Cervical/thoracic deviations alignment: fair sitting posture. thoracic scoliosis.  Shoulder/UE:  Scapular winging L and scapular winging R  Lumbar:  Lordosis decr  Gait:  Increase knee valgus with stance  Deviations:  Hip:  Decr dynamic control L and decr dynamic control RGeneral Deviations:  Base of support decr  Flexibility/Screens:   Positive screens:  KneeNegative screens: Hip (weakness)     Re: Bharath Winchester   :   1950    Lower Extremity:  Decreased left lower extremity flexibility:Hamstrings  Decreased right lower extremity flexibility:  Hamstrings       Knee Evaluation:  ROM:    AROM  Extension:  Left: 13    Right:  13  Flexion: Left: 130    Right: 133  PROM  Hyperextension: Left: 2    Right:  0  Extension: Left: 0    Right:  0  Flexion: Left: 145    Right:  140  Endfeel: hip abduction right 3+/5, left 4-/5  Strength:   Extension:  Left: 5/5   Pain:      Right: 5-/5   Pain:  Flexion:  Left: 5/5   Pain:      Right: 5/5   Pain:    Quad Set Left: Fair    Pain:   Quad Set Right: Poor and delayed    Pain:  Ligament Testing:    Varus 0:  Right:  Neg  Valgus 0:  Right:  Neg  Anterior Drawer:  Right:  Neg  Special Tests:   Right knee positive for the following tests:  Patellar Tracking-Abduction Lateral  Palpation:  Palpation of knee: right MCL.  Edema:  Not Assessed  Mobility Testing:    Patellofemoral Medial:  Right: hypomobile  Functional Testing:    Proprioception:   Stork Balance Test:  Left:  Good  Right:  Fair  % of Uninvolved:     Assessment/Plan:    Patient is a 68 year old female with right side knee complaints.    Patient has the following significant findings with corresponding treatment plan.                Diagnosis 1:  Right knee pain with hip weakness  Pain -  manual therapy, self management, education and home program  Decreased ROM/flexibility - manual therapy,  therapeutic exercise, therapeutic activity and home program  Decreased joint mobility - manual therapy, therapeutic exercise, therapeutic activity and home program  Decreased strength - therapeutic exercise, therapeutic activities and home program  Impaired balance - neuro re-education, gait training, therapeutic activities and home program  Impaired gait - gait training and home program  Impaired muscle performance - neuro re-education and home program  Decreased function - therapeutic activities and home program    Therapy Evaluation Codes:   Re: Bharath Winchester   :   1950    Cumulative Therapy Evaluation is: Low complexity.    Previous and current functional limitations:  (See Goal Flow Sheet for this information)    Short term and Long term goals: (See Goal Flow Sheet for this information)     Communication ability:  Patient appears to be able to clearly communicate and understand verbal and written communication and follow directions correctly.  Treatment Explanation - The following has been discussed with the patient:   RX ordered/plan of care  Possible risks and side effects  This patient would benefit from PT intervention to resume normal activities.   Rehab potential is good.    Frequency:  1 X week, once daily  Duration:  for 8 weeks  Discharge Plan:  Achieve all LTG.  Independent in home treatment program.    Thank you for your referral.    INQUIRIES  Therapist: Keira Jett, PT   [facility] [place] [facility]  [address] [address]  Phone: [phone]  Fax: [phone]

## 2019-05-06 NOTE — PROGRESS NOTES
[facility] Initial Evaluation  Subjective:  The history is provided by the patient. No  was used.   Bharath Winchester is a 68 year old female with a right knee condition.  Condition occurred with:  Repetition/overuse (stairs).  Condition occurred: at home.  This is a new condition  On or about April 25, 2019, I was doing a lot of stairs to do some upstairs painting. My right knee became painful and stairs became difficult.  The knee pain is only going up the stairs..  .    Patient reports pain:  Anterior.  Radiates to:  Knee, lower leg and thigh (up the back ).  Pain is described as sharp (feeling of giving away) and is intermittent and reported as 4/10.  Associated symptoms:  Buckling/giving out and loss of strength. Pain is worse in the P.M..  Symptoms are exacerbated by ascending stairs and weight bearing and relieved by rest (stretching, decreasing activity).  Since onset symptoms are unchanged.  Special testing: none.  Previous treatment includes physical therapy (low back ).  There was moderate improvement following previous treatment.  General health as reported by patient is good.  Pertinent medical history includes:  Migraines/headaches and high blood pressure (back pain).  Medical allergies: no.  Other surgeries include:  Orthopedic surgery (low back).  Current medications:  High blood pressure medication.  Current occupation is psychologist.  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  Primary job tasks include:  Prolonged sitting (computer).    Barriers: house and cabin.  with steps, gardening.    Red flags:  None as reported by the patient.                        Objective:  Standing Alignment:    Cervical/thoracic deviations alignment: fair sitting posture. thoracic scoliosis.  Shoulder/UE:  Scapular winging L and scapular winging R  Lumbar:  Lordosis decr            Gait:  Increase knee valgus with stance    Deviations:  Hip:  Decr dynamic control L and  decr dynamic control RGeneral Deviations:  Base of support decr    Flexibility/Screens:   Positive screens:  KneeNegative screens: Hip (weakness)     Lower Extremity:  Decreased left lower extremity flexibility:Hamstrings    Decreased right lower extremity flexibility:  Hamstrings                                                      Knee Evaluation:  ROM:    AROM      Extension:  Left: 13    Right:  13  Flexion: Left: 130    Right: 133  PROM    Hyperextension: Left: 2    Right:  0  Extension: Left: 0    Right:  0  Flexion: Left: 145    Right:  140    Endfeel: hip abduction right 3+/5, left 4-/5  Strength:     Extension:  Left: 5/5   Pain:      Right: 5-/5   Pain:  Flexion:  Left: 5/5   Pain:      Right: 5/5   Pain:    Quad Set Left: Fair    Pain:   Quad Set Right: Poor and delayed    Pain:  Ligament Testing:    Varus 0:  Right:  Neg    Valgus 0:  Right:  Neg    Anterior Drawer:  Right:  Neg      Special Tests:     Right knee positive for the following tests:  Patellar Tracking-Abduction Lateral  Palpation:  Palpation of knee: right MCL.      Edema:  Not Assessed    Mobility Testing:      Patellofemoral Medial:  Right: hypomobile        Functional Testing:            Proprioception:   Stork Balance Test:  Left:  Good  Right:  Fair  % of Uninvolved:           General     ROS    Assessment/Plan:    Patient is a 68 year old female with right side knee complaints.    Patient has the following significant findings with corresponding treatment plan.                Diagnosis 1:  Right knee pain with hip weakness  Pain -  manual therapy, self management, education and home program  Decreased ROM/flexibility - manual therapy, therapeutic exercise, therapeutic activity and home program  Decreased joint mobility - manual therapy, therapeutic exercise, therapeutic activity and home program  Decreased strength - therapeutic exercise, therapeutic activities and home program  Impaired balance - neuro re-education, gait training,  therapeutic activities and home program  Impaired gait - gait training and home program  Impaired muscle performance - neuro re-education and home program  Decreased function - therapeutic activities and home program    Therapy Evaluation Codes:   Cumulative Therapy Evaluation is: Low complexity.    Previous and current functional limitations:  (See Goal Flow Sheet for this information)    Short term and Long term goals: (See Goal Flow Sheet for this information)     Communication ability:  Patient appears to be able to clearly communicate and understand verbal and written communication and follow directions correctly.  Treatment Explanation - The following has been discussed with the patient:   RX ordered/plan of care  Possible risks and side effects  This patient would benefit from PT intervention to resume normal activities.   Rehab potential is good.    Frequency:  1 X week, once daily  Duration:  for 8 weeks  Discharge Plan:  Achieve all LTG.  Independent in home treatment program.    Please refer to the daily flowsheet for treatment today, total treatment time and time spent performing 1:1 timed codes.

## 2019-05-07 PROBLEM — M25.561 ACUTE PAIN OF RIGHT KNEE: Status: ACTIVE | Noted: 2019-05-07

## 2019-05-15 ENCOUNTER — THERAPY VISIT (OUTPATIENT)
Dept: PHYSICAL THERAPY | Facility: CLINIC | Age: 69
End: 2019-05-15
Payer: COMMERCIAL

## 2019-05-15 DIAGNOSIS — M25.561 ACUTE PAIN OF RIGHT KNEE: ICD-10-CM

## 2019-05-15 PROCEDURE — 97110 THERAPEUTIC EXERCISES: CPT | Mod: GP | Performed by: PHYSICAL THERAPIST

## 2019-05-15 PROCEDURE — 97112 NEUROMUSCULAR REEDUCATION: CPT | Mod: GP | Performed by: PHYSICAL THERAPIST

## 2019-05-24 ENCOUNTER — THERAPY VISIT (OUTPATIENT)
Dept: PHYSICAL THERAPY | Facility: CLINIC | Age: 69
End: 2019-05-24
Payer: COMMERCIAL

## 2019-05-24 DIAGNOSIS — M25.561 ACUTE PAIN OF RIGHT KNEE: ICD-10-CM

## 2019-05-24 PROCEDURE — 97110 THERAPEUTIC EXERCISES: CPT | Mod: GP | Performed by: PHYSICAL THERAPIST

## 2019-05-24 PROCEDURE — 97530 THERAPEUTIC ACTIVITIES: CPT | Mod: GP | Performed by: PHYSICAL THERAPIST

## 2019-05-24 PROCEDURE — 97112 NEUROMUSCULAR REEDUCATION: CPT | Mod: GP | Performed by: PHYSICAL THERAPIST

## 2019-06-13 ENCOUNTER — THERAPY VISIT (OUTPATIENT)
Dept: PHYSICAL THERAPY | Facility: CLINIC | Age: 69
End: 2019-06-13
Payer: COMMERCIAL

## 2019-06-13 DIAGNOSIS — M25.561 ACUTE PAIN OF RIGHT KNEE: ICD-10-CM

## 2019-06-13 PROCEDURE — 97110 THERAPEUTIC EXERCISES: CPT | Mod: GP | Performed by: PHYSICAL THERAPIST

## 2019-06-13 PROCEDURE — 97530 THERAPEUTIC ACTIVITIES: CPT | Mod: GP | Performed by: PHYSICAL THERAPIST

## 2019-06-13 PROCEDURE — 97112 NEUROMUSCULAR REEDUCATION: CPT | Mod: GP | Performed by: PHYSICAL THERAPIST

## 2019-06-13 ASSESSMENT — ACTIVITIES OF DAILY LIVING (ADL)
AS_A_RESULT_OF_YOUR_KNEE_INJURY,_HOW_WOULD_YOU_RATE_YOUR_CURRENT_LEVEL_OF_DAILY_ACTIVITY?: NEARLY NORMAL
WALK: ACTIVITY IS NOT DIFFICULT
GIVING WAY, BUCKLING OR SHIFTING OF KNEE: I HAVE THE SYMPTOM BUT IT DOES NOT AFFECT MY ACTIVITY
KNEEL ON THE FRONT OF YOUR KNEE: ACTIVITY IS SOMEWHAT DIFFICULT
SQUAT: ACTIVITY IS SOMEWHAT DIFFICULT
SWELLING: I DO NOT HAVE THE SYMPTOM
RAW_SCORE: 59
LIMPING: I DO NOT HAVE THE SYMPTOM
PAIN: I HAVE THE SYMPTOM BUT IT DOES NOT AFFECT MY ACTIVITY
STIFFNESS: THE SYMPTOM AFFECTS MY ACTIVITY SLIGHTLY
SIT WITH YOUR KNEE BENT: ACTIVITY IS NOT DIFFICULT
HOW_WOULD_YOU_RATE_THE_OVERALL_FUNCTION_OF_YOUR_KNEE_DURING_YOUR_USUAL_DAILY_ACTIVITIES?: NEARLY NORMAL
HOW_WOULD_YOU_RATE_THE_CURRENT_FUNCTION_OF_YOUR_KNEE_DURING_YOUR_USUAL_DAILY_ACTIVITIES_ON_A_SCALE_FROM_0_TO_100_WITH_100_BEING_YOUR_LEVEL_OF_KNEE_FUNCTION_PRIOR_TO_YOUR_INJURY_AND_0_BEING_THE_INABILITY_TO_PERFORM_ANY_OF_YOUR_USUAL_DAILY_ACTIVITIES?: 90
KNEE_ACTIVITY_OF_DAILY_LIVING_SUM: 59
GO DOWN STAIRS: ACTIVITY IS NOT DIFFICULT
WEAKNESS: THE SYMPTOM AFFECTS MY ACTIVITY SLIGHTLY
RISE FROM A CHAIR: ACTIVITY IS NOT DIFFICULT
GO UP STAIRS: ACTIVITY IS MINIMALLY DIFFICULT
KNEE_ACTIVITY_OF_DAILY_LIVING_SCORE: 84.29
STAND: ACTIVITY IS NOT DIFFICULT

## 2019-06-13 NOTE — LETTER
[facility] ATHLETIC [facility] PHYSICAL THERAPY  [address]  [place] [address]  [phone]    2019    Re: Bharath Winchester   :   1950  MRN:  [id number]   REFERRING PHYSICIAN:   Denise Lozano    [facility] ATHLETIC [facility] PHYSICAL [facility]    Date of Initial Evaluation:  2019  Visits:  Rxs Used: 4  Reason for Referral:  Acute pain of right knee      PROGRESS  REPORT  Progress reporting period is from 2019 to 2019.       SUBJECTIVE  Subjective changes noted by patient: I feel I am about 90% of my normal.  I don't have that pain and getting stronger.     Current Pain level: 0/10 (occasional ache).     Initial Pain level: 4/10.   Changes in function: Yes (See Goal flowsheet attached for changes in current functional level)  Adverse reaction to treatment or activity: None    OBJECTIVE  Changes noted in objective findings: Yes, Balance single leg: left 22 seconds, right 30. Strength quad/hamstring 5/5 right hip abduction 4/5, left 4-/5.  Able to go up and down 5/6 inch step with increase control.  Cueing for gluteal control to assist with better knee position.  Decrease in tightness in L/E.  Discussed with patient progression of exercises and home exercise program.     ASSESSMENT/PLAN  Updated problem list and treatment plan:   Diagnosis 1: Right knee pain     Decreased strength - therapeutic exercise, therapeutic activities and home program  Impaired balance - neuro re-education, therapeutic activities and home program  Impaired gait - gait training and home program  Impaired muscle performance - neuro re-education and home program  Decreased function - therapeutic activities and home program  STG/LTGs have been met or progress has been made towards goals: Yes (See Goal flow sheet completed today.)  Assessment of Progress: The patient's condition is improving.  The patient's condition has potential to improve.  Patient is  meeting short term goals and is progressing towards long term goals.  Self Management Plans: Patient has been instructed in a home treatment program.  Patient has been instructed in self management of symptoms.    Re: Bharath Winchester   :   1950    Recommendations:  Patient to be traveling a lot in the near future and wants to do exercises on her own.  Patient to call if any questions.  Patient does have appointments left and may return to therapy if needed.  Thank you for the opportunity of working with this motivated individual.          Thank you for your referral.    INQUIRIES  Therapist: Keira Jett PT   [facility] [place] [facility]AP[facility]  [address] [address]  Phone: [phone]  Fax: [phone]

## 2019-06-13 NOTE — PROGRESS NOTES
Subjective:  HPI       Knee Activity of Daily Living Score: 84.29            Objective:  System    Physical Exam    General     ROS    Assessment/Plan:    PROGRESS  REPORT    Progress reporting period is from 5/6/2019 to 6/13/2019.       SUBJECTIVE  Subjective changes noted by patient:  I feel I am about 90% of my normal.  I don't have that pain and getting stronger     Current Pain level: 0/10(occasional ache).      Initial Pain level: 4/10.   Changes in function:  Yes (See Goal flowsheet attached for changes in current functional level)  Adverse reaction to treatment or activity: None    OBJECTIVE  Changes noted in objective findings:  Yes, Balance single leg: left 22 seconds, right 30. Strength quad/hamstring 5/5 right hip abduction 4/5, left 4-/5. Able to go up and down 5/6 inch step with increase control.  Cueing for gluteal control to assist with better knee position.  Decrease in tightness in L/E.  Discussed with patient progression of exercises and home exercise program.       ASSESSMENT/PLAN  Updated problem list and treatment plan: Diagnosis 1:  Right knee pain   Decreased strength - therapeutic exercise, therapeutic activities and home program  Impaired balance - neuro re-education, therapeutic activities and home program  Impaired gait - gait training and home program  Impaired muscle performance - neuro re-education and home program  Decreased function - therapeutic activities and home program  STG/LTGs have been met or progress has been made towards goals:  Yes (See Goal flow sheet completed today.)  Assessment of Progress: The patient's condition is improving.  The patient's condition has potential to improve.  Patient is meeting short term goals and is progressing towards long term goals.  Self Management Plans:  Patient has been instructed in a home treatment program.  Patient  has been instructed in self management of symptoms.      Recommendations:  Patient to be traveling a lot in the near future  and wants to do exercises on her own.  Patient to call if any questions.  Patient does have appointments left and may return to therapy if needed.  Thank you for the opportunity of working with this motivated individual.    Please refer to the daily flowsheet for treatment today, total treatment time and time spent performing 1:1 timed codes.

## 2019-10-07 PROBLEM — M25.561 ACUTE PAIN OF RIGHT KNEE: Status: RESOLVED | Noted: 2019-05-07 | Resolved: 2019-10-07

## 2019-10-07 NOTE — PROGRESS NOTES
Patient did not return for further treatment and no additional progress was noted.  Please refer to the progress note and goal flowsheet completed on 06/13/19 for discharge information.